# Patient Record
Sex: MALE | Race: WHITE | NOT HISPANIC OR LATINO
[De-identification: names, ages, dates, MRNs, and addresses within clinical notes are randomized per-mention and may not be internally consistent; named-entity substitution may affect disease eponyms.]

---

## 2017-02-09 ENCOUNTER — APPOINTMENT (OUTPATIENT)
Dept: CARDIOLOGY | Facility: CLINIC | Age: 77
End: 2017-02-09

## 2017-02-14 ENCOUNTER — APPOINTMENT (OUTPATIENT)
Dept: FAMILY MEDICINE | Facility: CLINIC | Age: 77
End: 2017-02-14

## 2017-02-15 ENCOUNTER — APPOINTMENT (OUTPATIENT)
Dept: FAMILY MEDICINE | Facility: CLINIC | Age: 77
End: 2017-02-15

## 2017-02-15 ENCOUNTER — APPOINTMENT (OUTPATIENT)
Dept: CARDIOLOGY | Facility: CLINIC | Age: 77
End: 2017-02-15

## 2017-02-28 ENCOUNTER — APPOINTMENT (OUTPATIENT)
Dept: PULMONOLOGY | Facility: CLINIC | Age: 77
End: 2017-02-28

## 2017-02-28 ENCOUNTER — LABORATORY RESULT (OUTPATIENT)
Age: 77
End: 2017-02-28

## 2017-03-07 ENCOUNTER — LABORATORY RESULT (OUTPATIENT)
Age: 77
End: 2017-03-07

## 2017-03-15 ENCOUNTER — LABORATORY RESULT (OUTPATIENT)
Age: 77
End: 2017-03-15

## 2017-03-17 ENCOUNTER — APPOINTMENT (OUTPATIENT)
Dept: FAMILY MEDICINE | Facility: CLINIC | Age: 77
End: 2017-03-17

## 2017-03-17 VITALS
SYSTOLIC BLOOD PRESSURE: 130 MMHG | WEIGHT: 315 LBS | TEMPERATURE: 98.4 F | OXYGEN SATURATION: 94 % | RESPIRATION RATE: 14 BRPM | DIASTOLIC BLOOD PRESSURE: 78 MMHG | HEART RATE: 78 BPM | BODY MASS INDEX: 40.43 KG/M2 | HEIGHT: 74 IN

## 2017-03-17 RX ORDER — GLIMEPIRIDE 4 MG/1
TABLET ORAL
Refills: 0 | Status: ACTIVE | COMMUNITY

## 2017-03-22 ENCOUNTER — LABORATORY RESULT (OUTPATIENT)
Age: 77
End: 2017-03-22

## 2017-03-27 ENCOUNTER — APPOINTMENT (OUTPATIENT)
Dept: PULMONOLOGY | Facility: CLINIC | Age: 77
End: 2017-03-27

## 2017-03-27 VITALS
BODY MASS INDEX: 40.43 KG/M2 | WEIGHT: 315 LBS | OXYGEN SATURATION: 80 % | SYSTOLIC BLOOD PRESSURE: 130 MMHG | DIASTOLIC BLOOD PRESSURE: 65 MMHG | HEIGHT: 74 IN | HEART RATE: 111 BPM

## 2017-03-27 RX ORDER — PREDNISONE 10 MG/1
10 TABLET ORAL
Qty: 100 | Refills: 0 | Status: ACTIVE | COMMUNITY
Start: 2017-03-27 | End: 1900-01-01

## 2017-03-28 ENCOUNTER — LABORATORY RESULT (OUTPATIENT)
Age: 77
End: 2017-03-28

## 2017-03-30 ENCOUNTER — MEDICATION RENEWAL (OUTPATIENT)
Age: 77
End: 2017-03-30

## 2017-04-03 ENCOUNTER — MEDICATION RENEWAL (OUTPATIENT)
Age: 77
End: 2017-04-03

## 2017-04-03 RX ORDER — AZELASTINE HYDROCHLORIDE 137 UG/1
0.1 SPRAY, METERED NASAL TWICE DAILY
Qty: 3 | Refills: 1 | Status: ACTIVE | COMMUNITY
Start: 2017-03-30 | End: 1900-01-01

## 2017-04-07 ENCOUNTER — LABORATORY RESULT (OUTPATIENT)
Age: 77
End: 2017-04-07

## 2017-04-11 ENCOUNTER — LABORATORY RESULT (OUTPATIENT)
Age: 77
End: 2017-04-11

## 2017-04-18 ENCOUNTER — LABORATORY RESULT (OUTPATIENT)
Age: 77
End: 2017-04-18

## 2017-04-19 ENCOUNTER — APPOINTMENT (OUTPATIENT)
Dept: FAMILY MEDICINE | Facility: CLINIC | Age: 77
End: 2017-04-19

## 2017-04-19 VITALS
OXYGEN SATURATION: 98 % | BODY MASS INDEX: 40.43 KG/M2 | DIASTOLIC BLOOD PRESSURE: 80 MMHG | HEART RATE: 74 BPM | RESPIRATION RATE: 15 BRPM | SYSTOLIC BLOOD PRESSURE: 140 MMHG | TEMPERATURE: 98.4 F | HEIGHT: 74 IN | WEIGHT: 315 LBS

## 2017-04-19 DIAGNOSIS — E11.9 TYPE 2 DIABETES MELLITUS W/OUT COMPLICATIONS: ICD-10-CM

## 2017-04-19 DIAGNOSIS — G62.9 POLYNEUROPATHY, UNSPECIFIED: ICD-10-CM

## 2017-04-19 RX ORDER — DILTIAZEM HYDROCHLORIDE 420 MG/1
TABLET, EXTENDED RELEASE ORAL
Refills: 0 | Status: ACTIVE | COMMUNITY

## 2017-04-25 ENCOUNTER — LABORATORY RESULT (OUTPATIENT)
Age: 77
End: 2017-04-25

## 2017-05-02 ENCOUNTER — LABORATORY RESULT (OUTPATIENT)
Age: 77
End: 2017-05-02

## 2017-05-09 ENCOUNTER — LABORATORY RESULT (OUTPATIENT)
Age: 77
End: 2017-05-09

## 2017-05-16 ENCOUNTER — LABORATORY RESULT (OUTPATIENT)
Age: 77
End: 2017-05-16

## 2017-05-23 ENCOUNTER — LABORATORY RESULT (OUTPATIENT)
Age: 77
End: 2017-05-23

## 2017-05-30 ENCOUNTER — LABORATORY RESULT (OUTPATIENT)
Age: 77
End: 2017-05-30

## 2017-06-06 ENCOUNTER — APPOINTMENT (OUTPATIENT)
Dept: FAMILY MEDICINE | Facility: CLINIC | Age: 77
End: 2017-06-06

## 2017-06-06 ENCOUNTER — LABORATORY RESULT (OUTPATIENT)
Age: 77
End: 2017-06-06

## 2017-06-06 DIAGNOSIS — I25.10 ATHEROSCLEROTIC HEART DISEASE OF NATIVE CORONARY ARTERY W/OUT ANGINA PECTORIS: ICD-10-CM

## 2017-06-06 DIAGNOSIS — I77.1 STRICTURE OF ARTERY: ICD-10-CM

## 2017-06-06 DIAGNOSIS — J44.9 CHRONIC OBSTRUCTIVE PULMONARY DISEASE, UNSPECIFIED: ICD-10-CM

## 2017-06-06 DIAGNOSIS — I50.9 HEART FAILURE, UNSPECIFIED: ICD-10-CM

## 2017-06-06 DIAGNOSIS — I10 ESSENTIAL (PRIMARY) HYPERTENSION: ICD-10-CM

## 2017-06-06 DIAGNOSIS — F98.8 OTHER SPECIFIED BEHAVIORAL AND EMOTIONAL DISORDERS WITH ONSET USUALLY OCCURRING IN CHILDHOOD AND ADOLESCENCE: ICD-10-CM

## 2017-06-06 RX ORDER — NYSTATIN AND TRIAMCINOLONE ACETONIDE 100000; 1 MG/G; MG/G
100000-0.1 CREAM TOPICAL
Qty: 60 | Refills: 5 | Status: ACTIVE | COMMUNITY
Start: 2017-06-06 | End: 1900-01-01

## 2017-06-13 ENCOUNTER — LABORATORY RESULT (OUTPATIENT)
Age: 77
End: 2017-06-13

## 2017-06-21 ENCOUNTER — APPOINTMENT (OUTPATIENT)
Dept: ORTHOPEDIC SURGERY | Facility: CLINIC | Age: 77
End: 2017-06-21

## 2017-06-21 VITALS
DIASTOLIC BLOOD PRESSURE: 81 MMHG | SYSTOLIC BLOOD PRESSURE: 111 MMHG | HEIGHT: 74 IN | BODY MASS INDEX: 39.01 KG/M2 | WEIGHT: 304 LBS | HEART RATE: 109 BPM

## 2017-06-21 DIAGNOSIS — M51.36 OTHER INTERVERTEBRAL DISC DEGENERATION, LUMBAR REGION: ICD-10-CM

## 2017-06-21 DIAGNOSIS — S32.000S WEDGE COMPRESSION FRACTURE OF UNSPECIFIED LUMBAR VERTEBRA, SEQUELA: ICD-10-CM

## 2017-06-21 RX ORDER — MINOCYCLINE HYDROCHLORIDE 100 MG/1
100 TABLET ORAL
Qty: 10 | Refills: 0 | Status: ACTIVE | COMMUNITY
Start: 2017-02-20

## 2017-06-21 RX ORDER — EMPAGLIFLOZIN AND LINAGLIPTIN 10; 5 MG/1; MG/1
10-5 TABLET, FILM COATED ORAL
Qty: 90 | Refills: 0 | Status: ACTIVE | COMMUNITY
Start: 2017-04-26

## 2017-06-23 ENCOUNTER — LABORATORY RESULT (OUTPATIENT)
Age: 77
End: 2017-06-23

## 2017-06-27 ENCOUNTER — APPOINTMENT (OUTPATIENT)
Dept: PULMONOLOGY | Facility: CLINIC | Age: 77
End: 2017-06-27

## 2017-06-27 ENCOUNTER — LABORATORY RESULT (OUTPATIENT)
Age: 77
End: 2017-06-27

## 2017-06-27 DIAGNOSIS — Z99.89 DEPENDENCE ON OTHER ENABLING MACHINES AND DEVICES: ICD-10-CM

## 2017-06-27 DIAGNOSIS — R93.8 ABNORMAL FINDINGS ON DIAGNOSTIC IMAGING OF OTHER SPECIFIED BODY STRUCTURES: ICD-10-CM

## 2017-06-27 DIAGNOSIS — R06.02 SHORTNESS OF BREATH: ICD-10-CM

## 2017-06-27 DIAGNOSIS — J84.9 INTERSTITIAL PULMONARY DISEASE, UNSPECIFIED: ICD-10-CM

## 2017-06-27 DIAGNOSIS — J47.9 BRONCHIECTASIS, UNCOMPLICATED: ICD-10-CM

## 2017-06-27 DIAGNOSIS — E66.9 OBESITY, UNSPECIFIED: ICD-10-CM

## 2017-06-27 DIAGNOSIS — J30.9 ALLERGIC RHINITIS, UNSPECIFIED: ICD-10-CM

## 2017-06-27 DIAGNOSIS — R26.89 OTHER ABNORMALITIES OF GAIT AND MOBILITY: ICD-10-CM

## 2017-06-27 DIAGNOSIS — G47.33 OBSTRUCTIVE SLEEP APNEA (ADULT) (PEDIATRIC): ICD-10-CM

## 2017-06-27 DIAGNOSIS — R94.2 ABNORMAL RESULTS OF PULMONARY FUNCTION STUDIES: ICD-10-CM

## 2017-07-11 ENCOUNTER — LABORATORY RESULT (OUTPATIENT)
Age: 77
End: 2017-07-11

## 2017-07-18 ENCOUNTER — LABORATORY RESULT (OUTPATIENT)
Age: 77
End: 2017-07-18

## 2017-07-25 ENCOUNTER — RX RENEWAL (OUTPATIENT)
Age: 77
End: 2017-07-25

## 2017-07-25 ENCOUNTER — LABORATORY RESULT (OUTPATIENT)
Age: 77
End: 2017-07-25

## 2017-07-25 RX ORDER — PREDNISONE 10 MG/1
10 TABLET ORAL
Qty: 1 | Refills: 0 | Status: ACTIVE | COMMUNITY
Start: 2017-07-25 | End: 1900-01-01

## 2017-08-22 ENCOUNTER — LABORATORY RESULT (OUTPATIENT)
Age: 77
End: 2017-08-22

## 2017-08-29 ENCOUNTER — LABORATORY RESULT (OUTPATIENT)
Age: 77
End: 2017-08-29

## 2017-09-05 ENCOUNTER — FORM ENCOUNTER (OUTPATIENT)
Age: 77
End: 2017-09-05

## 2017-09-05 ENCOUNTER — LABORATORY RESULT (OUTPATIENT)
Age: 77
End: 2017-09-05

## 2017-09-06 ENCOUNTER — OUTPATIENT (OUTPATIENT)
Dept: OUTPATIENT SERVICES | Facility: HOSPITAL | Age: 77
LOS: 1 days | End: 2017-09-06
Payer: MEDICARE

## 2017-09-06 ENCOUNTER — APPOINTMENT (OUTPATIENT)
Dept: CT IMAGING | Facility: CLINIC | Age: 77
End: 2017-09-06
Payer: MEDICARE

## 2017-09-06 DIAGNOSIS — Z00.8 ENCOUNTER FOR OTHER GENERAL EXAMINATION: ICD-10-CM

## 2017-09-06 DIAGNOSIS — J84.9 INTERSTITIAL PULMONARY DISEASE, UNSPECIFIED: ICD-10-CM

## 2017-09-06 PROCEDURE — 71250 CT THORAX DX C-: CPT | Mod: 26

## 2017-09-06 PROCEDURE — 71250 CT THORAX DX C-: CPT

## 2017-09-12 ENCOUNTER — APPOINTMENT (OUTPATIENT)
Dept: FAMILY MEDICINE | Facility: CLINIC | Age: 77
End: 2017-09-12

## 2017-09-13 ENCOUNTER — RX RENEWAL (OUTPATIENT)
Age: 77
End: 2017-09-13

## 2017-09-13 RX ORDER — ATORVASTATIN CALCIUM 40 MG/1
40 TABLET, FILM COATED ORAL DAILY
Qty: 90 | Refills: 2 | Status: ACTIVE | COMMUNITY
Start: 2017-09-13 | End: 1900-01-01

## 2017-09-19 ENCOUNTER — LABORATORY RESULT (OUTPATIENT)
Age: 77
End: 2017-09-19

## 2017-09-20 LAB — ERYTHROCYTE [SEDIMENTATION RATE] IN BLOOD BY WESTERGREN METHOD: 11 MM/HR

## 2017-09-21 LAB
CCP AB SER IA-ACNC: <8 UNITS
ENA JO1 AB SER IA-ACNC: <0.2 AL
ENA RNP AB SER IA-ACNC: 0.2 AL
ENA RNP AB SER IA-ACNC: 0.2 AL
ENA SCL70 IGG SER IA-ACNC: <0.2 AL
ENA SM AB SER IA-ACNC: <0.2 AL
ENA SS-A AB SER IA-ACNC: 7.4 AL
ENA SS-B AB SER IA-ACNC: <0.2 AL
MPO AB + PR3 PNL SER: NORMAL
RF+CCP IGG SER-IMP: NEGATIVE
RHEUMATOID FACT SER QL: 13 IU/ML

## 2017-09-23 LAB
ANA PAT FLD IF-IMP: ABNORMAL
ANA PATTERN: ABNORMAL
ANA SER IF-ACNC: ABNORMAL
ANA TITER: ABNORMAL
ANCA AB SER-IMP: ABNORMAL
C-ANCA SER-ACNC: NORMAL
P-ANCA TITR SER IF: NORMAL

## 2017-09-24 LAB — HP PNL SER: NORMAL

## 2017-09-26 ENCOUNTER — LABORATORY RESULT (OUTPATIENT)
Age: 77
End: 2017-09-26

## 2017-10-02 ENCOUNTER — LABORATORY RESULT (OUTPATIENT)
Age: 77
End: 2017-10-02

## 2017-10-10 ENCOUNTER — LABORATORY RESULT (OUTPATIENT)
Age: 77
End: 2017-10-10

## 2017-10-27 ENCOUNTER — APPOINTMENT (OUTPATIENT)
Dept: PULMONOLOGY | Facility: CLINIC | Age: 77
End: 2017-10-27

## 2017-11-08 ENCOUNTER — APPOINTMENT (OUTPATIENT)
Dept: ORTHOPEDIC SURGERY | Facility: CLINIC | Age: 77
End: 2017-11-08
Payer: MEDICARE

## 2017-11-08 VITALS — SYSTOLIC BLOOD PRESSURE: 105 MMHG | HEIGHT: 74 IN | DIASTOLIC BLOOD PRESSURE: 66 MMHG | HEART RATE: 132 BPM

## 2017-11-08 DIAGNOSIS — S32.000A WEDGE COMPRESSION FRACTURE OF UNSPECIFIED LUMBAR VERTEBRA, INITIAL ENCOUNTER FOR CLOSED FRACTURE: ICD-10-CM

## 2017-11-08 PROCEDURE — 99214 OFFICE O/P EST MOD 30 MIN: CPT

## 2017-11-08 PROCEDURE — 72100 X-RAY EXAM L-S SPINE 2/3 VWS: CPT

## 2017-11-08 RX ORDER — NYSTATIN 100000 [USP'U]/ML
100000 SUSPENSION ORAL
Qty: 473 | Refills: 0 | Status: ACTIVE | COMMUNITY
Start: 2017-08-23

## 2017-11-08 RX ORDER — LINAGLIPTIN 5 MG/1
5 TABLET, FILM COATED ORAL
Qty: 90 | Refills: 0 | Status: ACTIVE | COMMUNITY
Start: 2017-08-14

## 2017-11-08 NOTE — DISCUSSION/SUMMARY
[Medication Risks Reviewed] : Medication risks reviewed [de-identified] : The patient was admitted to [facility] On October 17, 2017 and discharged on 10/26/ 2017. He had tried sitting on a chair and missed and fell on his buttocks. He was on the floor for about 4 hours before he decides called EMS. He was unable to stand. The patient was noted to have compression fracture after the fall. Kyphoplasty was attempted by interventional radiology but failed due to hypoxia and cyanosis. The patient did not want to have procedure again after the event. His pain has been managed well with opioid acetaminophen regimen. The results of any imaging studies performed the fracture are not available to me today.  He has a known diagnosis of Compression fracture of the L3 vertebral body based on prior studies performed here. Apparently he now has new fractures of L2 and L5 based on the [facility] and [facility]\par On my review the x-rays obtained today a superior endplate compression deformity is seen of L2 which is new and a chronic compression seen of L3 which is unchanged. The L5 compression deformity report it is not clearly visualized or apparent. I recommended the patient \par Provide me the imaging studies performed at [place] to get a better diagnosis.\par \par The patient is a poor candidate for any invasive treatment given his episode of hypoxia and cyanosis at [place] while attempting a kyphoplasty. His fractures appear stable at this point in may be managed nonsurgically. His body habitus also precludes any significant bracing though a lumbar corset may be attempted for symptomatic relief. Recommended followup in 4 weeks for repeat x-rays of the lumbar spine. Recommended he see an orthopedic surgeon for further discussion of his left hip and left knee pain.\par \par He has found oxycodone difficulty because of constipation side effects. He may use Naprosyn as tolerated for his symptoms.

## 2017-11-08 NOTE — PHYSICAL EXAM
[Obese] : obese [Stooped] : stooped [Walker] : ambulates with walker [Limited] : is limited [LE] : Sensory: Intact in bilateral lower extremities [1+] : left ankle jerk 1+ [DP] : dorsalis pedis 2+ and symmetric bilaterally [PT] : posterior tibial 2+ and symmetric bilaterally [Poor Appearance] : well-appearing [Acute Distress] : not in acute distress [Abl Mood] : in a normal mood [Abl Affect] : with normal affect [Poor Coordination] : normal coordination [Disorientation] : oriented x 3 [Painful] : not painful [SLR] : negative straight leg raise [Plantar Reflex Right Only] : absent on the right [Plantar Reflex Left Only] : absent on the left [DTR Reflexes Clonus Of Right Ankle (___ Beats)] : absent on the right [DTR Reflexes Clonus Of Left Ankle (___ Beats)] : absent on the left [FreeTextEntry2] : The pt is awake, alert and oriented to self, place and time, is comfortable and in no acute distress. Gait examination reveals a narrow based, non-ataxic, non-antalgic gait With the wide based stance. Cannot heel and toe walk. 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  There is no obvious abnormal spinal curvature in the sagittal and coronal planes With a stooped forward posture noted. There is no tenderness over the cervical, thoracic or lumbar spine, or the upper and lower extremities musculature. Paraspinal lumbar tenderness. There is no sacroiliac tenderness. No greater trochanteric tenderness bilaterally. No atrophy or abnormal movements noted in the upper or lower extremities. There is no swelling noted in the upper or lower extremities bilaterally. No cervical lymphadenopathy noted anteriorly. No joint laxity noted in the upper and lower extremity joints bilaterally.\par Negative straight leg raise to 45Â° in the sitting position bilaterally. There is no groin pain with hip internal rotation and a negative AJN test bilaterally.  [de-identified] : Range of motion lumbar spine is limited with forward flexion to the knees in extension and 30Â° [de-identified] : venous stasis disease both legs\par \par ecchymosis of forearm\par \par seen in a wheelchair\par Nasal cannula oxygen [de-identified] : AP and lateral image of the lumbar spine obtained today were compared to x-rays obtained on June 21, 2017. Previously noted L3 compression fractures again seen without any significant interval change. A new superior endplate compression deformities seen of L2 without significant loss of vertebral body height. It's unclear if there are any fractures of the superior of L5. Degenerative changes are seen in the lower thoracic spine visualized which is unchanged.

## 2017-11-08 NOTE — HISTORY OF PRESENT ILLNESS
[Worsening] : worsening [___ wks] : [unfilled] week(s) ago [FreeTextEntry1] : low back and left knee pain [FreeTextEntry2] : Patient is here today for evaluation on his back and left knee pain after falling on 10/15/17 taken and admitted to [facility]  wanted to do surgery but patient refused went to a nursing home on 11/1/17.\par IS able to stand and walk with some pain.